# Patient Record
Sex: FEMALE | Race: NATIVE HAWAIIAN OR OTHER PACIFIC ISLANDER
[De-identification: names, ages, dates, MRNs, and addresses within clinical notes are randomized per-mention and may not be internally consistent; named-entity substitution may affect disease eponyms.]

---

## 2018-12-04 ENCOUNTER — HOSPITAL ENCOUNTER (EMERGENCY)
Dept: HOSPITAL 14 - H.ER | Age: 28
Discharge: HOME | End: 2018-12-04
Payer: COMMERCIAL

## 2018-12-04 VITALS — HEART RATE: 68 BPM | SYSTOLIC BLOOD PRESSURE: 90 MMHG | DIASTOLIC BLOOD PRESSURE: 49 MMHG | RESPIRATION RATE: 16 BRPM

## 2018-12-04 VITALS — TEMPERATURE: 99.6 F

## 2018-12-04 VITALS — OXYGEN SATURATION: 94 %

## 2018-12-04 DIAGNOSIS — N39.0: ICD-10-CM

## 2018-12-04 DIAGNOSIS — N61.0: Primary | ICD-10-CM

## 2018-12-04 LAB
ALBUMIN SERPL-MCNC: 3.4 G/DL (ref 3.5–5)
ALBUMIN/GLOB SERPL: 1.2 {RATIO} (ref 1–2.1)
ALT SERPL-CCNC: 39 U/L (ref 9–52)
APTT BLD: 32.3 SECONDS (ref 25.6–37.1)
AST SERPL-CCNC: 27 U/L (ref 14–36)
BACTERIA #/AREA URNS HPF: (no result) /[HPF]
BASE EXCESS BLDV CALC-SCNC: 0.7 MMOL/L (ref 0–2)
BASOPHILS # BLD AUTO: 0 K/UL (ref 0–0.2)
BASOPHILS NFR BLD: 0.1 % (ref 0–2)
BILIRUB UR-MCNC: NEGATIVE MG/DL
BUN SERPL-MCNC: 16 MG/DL (ref 7–17)
CALCIUM SERPL-MCNC: 7.9 MG/DL (ref 8.4–10.2)
COLOR UR: YELLOW
EOSINOPHIL # BLD AUTO: 0 K/UL (ref 0–0.7)
EOSINOPHIL NFR BLD: 0.1 % (ref 0–4)
ERYTHROCYTE [DISTWIDTH] IN BLOOD BY AUTOMATED COUNT: 13.1 % (ref 11.5–14.5)
GFR NON-AFRICAN AMERICAN: > 60
GLUCOSE UR STRIP-MCNC: (no result) MG/DL
HGB BLD-MCNC: 13.9 G/DL (ref 12–16)
INR PPP: 1.1
LEUKOCYTE ESTERASE UR-ACNC: (no result) LEU/UL
LG PLATELETS BLD QL SMEAR: PRESENT
LYMPHOCYTE: 11 % (ref 20–50)
LYMPHOCYTES # BLD AUTO: 0.9 K/UL (ref 1–4.3)
LYMPHOCYTES NFR BLD AUTO: 8.2 % (ref 20–40)
MCH RBC QN AUTO: 32.6 PG (ref 27–31)
MCHC RBC AUTO-ENTMCNC: 33.6 G/DL (ref 33–37)
MCV RBC AUTO: 97 FL (ref 81–99)
MONOCYTE: 8 % (ref 0–10)
MONOCYTES # BLD: 0.9 K/UL (ref 0–0.8)
MONOCYTES NFR BLD: 8.1 % (ref 0–10)
NEUTROPHILS # BLD: 9.3 K/UL (ref 1.8–7)
NEUTROPHILS NFR BLD AUTO: 76 % (ref 42–75)
NEUTROPHILS NFR BLD AUTO: 83.5 % (ref 50–75)
NEUTS BAND NFR BLD: 5 % (ref 0–2)
NRBC BLD AUTO-RTO: 0.1 % (ref 0–0)
PCO2 BLDV: 36 MMHG (ref 40–60)
PH BLDV: 7.44 [PH] (ref 7.32–7.43)
PH UR STRIP: 5 [PH] (ref 5–8)
PLATELET # BLD EST: NORMAL 10*3/UL
PLATELET # BLD: 276 K/UL (ref 130–400)
PMV BLD AUTO: 8.6 FL (ref 7.2–11.7)
PROT UR STRIP-MCNC: NEGATIVE MG/DL
PROTHROMBIN TIME: 12.6 SECONDS (ref 9.8–13.1)
RBC # BLD AUTO: 4.26 MIL/UL (ref 3.8–5.2)
RBC # UR STRIP: (no result) /UL
RBC MORPH BLD: NORMAL
SP GR UR STRIP: 1.02 (ref 1–1.03)
TOTAL CELLS COUNTED BLD: 100
TOXIC GRANULES BLD QL SMEAR: PRESENT
URINE CLARITY: (no result)
URINE HYALINE CAST: (no result) /HPF (ref 0–2)
UROBILINOGEN UR-MCNC: (no result) MG/DL (ref 0.2–1)
VENOUS BLOOD FIO2: 21 %
VENOUS BLOOD GAS PO2: 34 MM/HG (ref 30–55)
WBC # BLD AUTO: 11.1 K/UL (ref 4.8–10.8)

## 2018-12-04 PROCEDURE — 93005 ELECTROCARDIOGRAM TRACING: CPT

## 2018-12-04 PROCEDURE — 81025 URINE PREGNANCY TEST: CPT

## 2018-12-04 PROCEDURE — 87086 URINE CULTURE/COLONY COUNT: CPT

## 2018-12-04 PROCEDURE — 87181 SC STD AGAR DILUTION PER AGT: CPT

## 2018-12-04 PROCEDURE — 71045 X-RAY EXAM CHEST 1 VIEW: CPT

## 2018-12-04 PROCEDURE — 85025 COMPLETE CBC W/AUTO DIFF WBC: CPT

## 2018-12-04 PROCEDURE — 85730 THROMBOPLASTIN TIME PARTIAL: CPT

## 2018-12-04 PROCEDURE — 82948 REAGENT STRIP/BLOOD GLUCOSE: CPT

## 2018-12-04 PROCEDURE — 99285 EMERGENCY DEPT VISIT HI MDM: CPT

## 2018-12-04 PROCEDURE — 82803 BLOOD GASES ANY COMBINATION: CPT

## 2018-12-04 PROCEDURE — 80053 COMPREHEN METABOLIC PANEL: CPT

## 2018-12-04 PROCEDURE — 81003 URINALYSIS AUTO W/O SCOPE: CPT

## 2018-12-04 PROCEDURE — 85610 PROTHROMBIN TIME: CPT

## 2018-12-04 PROCEDURE — 83735 ASSAY OF MAGNESIUM: CPT

## 2018-12-04 PROCEDURE — 87040 BLOOD CULTURE FOR BACTERIA: CPT

## 2018-12-04 PROCEDURE — 84100 ASSAY OF PHOSPHORUS: CPT

## 2018-12-04 NOTE — ED PDOC
HPI: Fever


Time Seen by Provider: 18 07:56


What Antipyretic Given Prior To Arrival: Acetaminophen


Did The Patient Have A Seizure Today: No


Symptoms Associated With Fever: Other (Right breast pain)


Additional Comments: 28 years old female brought to ER by EMS for evaluation of 

fever associated with right breast pain onset yesterday. Per EMS, patient has a 

fever of temperature 102.1 degrees. Patient reports having  3 weeks ago

and is currently ? breastfeeding. Per , patient spoke to her OB-GYN 

yesterday about her symptoms, was diagnosed with mastitis, and was prescribed 

Dicloxacillin for infection.  reports patient woke up around 6 am cold 

and shaking prompting ED visit. Patient reports experiencing dizziness before 

but denies having it at this time. She states she took Tylenol for fever and her

last tab was at 7 am today with no relief. Patient reports pain is improved with

pumping. She denies any abdominal pain, nausea, vomiting back pain, dysuria, 

vaginal bleeding or any other urinary symptoms.  No cough, congestion, 

headaches.  No dysuria.  No abd pain.  OB-GYN: Dr. Kirk in [place]





Past Medical History


Reviewed: Historical Data, Nursing Documentation, Vital Signs


Vital Signs: 





                                Last Vital Signs











Temp  102.1 F H  18 07:58


 


Pulse  124 H  18 07:58


 


Resp  16   18 07:58


 


BP  79/44 L  18 07:58


 


Pulse Ox  94 L  18 07:58














- Medical History


PMH: No Chronic Diseases





- Surgical History


Surgical History: 





- Family History


Family History: States: Unknown Family Hx





- Social History


Current smoker - smoking cessation education provided: No


Alcohol: None


Drugs: Denies





- Home Medications


Home Medications: 


                                Ambulatory Orders











 Medication  Instructions  Recorded


 


Nitrofurantoin Macrocrystals 100 mg PO BID #10 cap 18





[Macrobid]  














- Allergies


Allergies/Adverse Reactions: 


                                    Allergies











Allergy/AdvReac Type Severity Reaction Status Date / Time


 


cephalexin [From Keflex] Allergy  SWELLING Verified 18 08:08


 


cobalt Allergy  RASH Verified 16 20:32


 


sorbitan sesquioleate Allergy  RASH Verified 16 20:32





[From Polysorb Hydrate]     


 


thimerosal Allergy  RASH Verified 16 20:33














Review of Systems


ROS Statement: Except As Marked, All Systems Reviewed And Found Negative


Constitutional: Positive for: Fever


Gastrointestinal: Negative for: Nausea, Vomiting, Abdominal Pain


Genitourinary Female: Negative for: Dysuria, Vaginal Bleeding


Musculoskeletal: Positive for: Other (breast pain).  Negative for: Back Pain


Neurological: Negative for: Dizziness





Physical Exam





- Reviewed


Nursing Documentation Reviewed: Yes


Vital Signs Reviewed: Yes





- Physical Exam


Appears: Positive for: Non-toxic, No Acute Distress


Head Exam: Positive for: ATRAUMATIC, NORMOCEPHALIC


Skin: Positive for: Normal Color, Dry


Neck: Positive for: Normal, Painless ROM, Supple


Cardiovascular/Chest: Positive for: Regular Rate, Rhythm, Other (Mild tenderness

to right breast at 8 o'clock with trace of erythema. No induration, fluctuance, 

or nipple discharge.  Milk  coming out on nipple stimulation. Meghna nurse was

chaperone along with scribe.).  Negative for: Murmur


Respiratory: Positive for: Normal Breath Sounds.  Negative for: Wheezing


Gastrointestinal/Abdominal: Positive for: Normal Exam, Soft, Other ( 

site healed and nontender; no dc or erythema).  Negative for: Tenderness


Back: Positive for: Normal Inspection.  Negative for: L CVA Tenderness, R CVA 

Tenderness


Extremity: Positive for: Normal ROM.  Negative for: Pedal Edema, Deformity


Neurologic/Psych: Positive for: Alert, Oriented (x3)





- Laboratory Results


Result Diagrams: 


                                 18 08:40





                                 18 10:20


Interpretation Of Abn Labs: no acute





- ECG


ECG: Positive for: Interpreted By Me, Viewed By Me


ECG Rhythm: Positive for: Normal QRS, Normal ST Segment, Sinus Rhythm


O2 Sat by Pulse Oximetry: 94 (RA)





- Radiology


X-Ray: Interpreted by Me, Viewed By Me


X-Ray Interpretation: No Acute Disease





- Progress


ED Course And Treament: 





1327:  Feels much better.  AAOx3.  States bp usually 90/50.  Not dizzy.  No 

pain.  Fu with pcp.  Continue antibiotics for mastitis.  Will rx for uti.  





Medical Decision Making


Medical Decision Making: 





Time: 819


Initial Plan:


--VBG Shock


--EKG


--CMP


--Magnesium


--Phosphorous


--CBC


--PTT


--PT


--Chest x-ray


--Motrin 600 mg PO


--NaCl 1,000 ml IV


--Blood culture


-- Urine culture


--Urinalysis





The nurse, Meghna, was present as a chaperon during exam.


____________________________________________________________


Scribe Attestation:


Documented by Yoselin Bonilla, acting as a scribe for Shiva Cardenas MD.





Provider Scribe Attestation:


All medical record entries made by the Scribe were at my direction and 

personally dictated by me. I have reviewed the chart and agree that the record 

accurately reflects my personal performance of the history, physical exam, 

medical decision making, and the department course for this patient. I have also

personally directed, reviewed, and agree with the discharge instructions and 

disposition.





Disposition





- Clinical Impression


Clinical Impression: 


 Mastitis, UTI (urinary tract infection)








- Patient ED Disposition


Is Patient to be Admitted: No





- Disposition


Referrals: 


[facility] [Outside] - 18


[facility] [Outside] - [facility]


Disposition: Routine/Home


Disposition Time: 13:28


Condition: STABLE


Additional Instructions: 


Return if not better in 3 days. 


Prescriptions: 


Nitrofurantoin Macrocrystals [Macrobid] 100 mg PO BID #10 cap


Instructions:  Mastitis, Urinary Tract Infection, Adult (DC)


Forms:  [facility] [facility] (English)

## 2018-12-05 NOTE — CARD
--------------- APPROVED REPORT --------------





Date of service: 12/04/2018



EKG Measurement

Heart [id number]

MN [id number]

[id number]

[id number]

[id number]



<Conclusion>

Normal sinus rhythm

Low voltage QRS

Nonspecific T wave abnormality

Abnormal ECG